# Patient Record
Sex: MALE | Race: BLACK OR AFRICAN AMERICAN | NOT HISPANIC OR LATINO | URBAN - METROPOLITAN AREA
[De-identification: names, ages, dates, MRNs, and addresses within clinical notes are randomized per-mention and may not be internally consistent; named-entity substitution may affect disease eponyms.]

---

## 2017-08-10 ENCOUNTER — EMERGENCY (EMERGENCY)
Facility: HOSPITAL | Age: 32
LOS: 1 days | Discharge: HOME | End: 2017-08-10
Admitting: EMERGENCY MEDICINE
Payer: COMMERCIAL

## 2017-08-10 VITALS
HEART RATE: 77 BPM | SYSTOLIC BLOOD PRESSURE: 138 MMHG | RESPIRATION RATE: 18 BRPM | DIASTOLIC BLOOD PRESSURE: 86 MMHG | OXYGEN SATURATION: 100 % | TEMPERATURE: 98 F

## 2017-08-10 PROCEDURE — 99284 EMERGENCY DEPT VISIT MOD MDM: CPT

## 2017-08-10 RX ORDER — CYCLOBENZAPRINE HYDROCHLORIDE 10 MG/1
1 TABLET, FILM COATED ORAL
Qty: 12 | Refills: 0 | OUTPATIENT
Start: 2017-08-10 | End: 2017-08-14

## 2017-08-10 RX ORDER — KETOROLAC TROMETHAMINE 30 MG/ML
30 SYRINGE (ML) INJECTION ONCE
Qty: 0 | Refills: 0 | Status: DISCONTINUED | OUTPATIENT
Start: 2017-08-10 | End: 2017-08-10

## 2017-08-10 RX ADMIN — Medication 30 MILLIGRAM(S): at 10:58

## 2017-08-10 NOTE — ED PROVIDER NOTE - OBJECTIVE STATEMENT
Pt is 33 y/o male with no significant PMHx here for eval of Rt sided LBP since yesterday. Pt states that he the silas started after he bent over to  heavy pipe at work. Pain has been coming and going, it is described as sharp, non-radiating, worse with movement,   pt denies ext weakness or numbness, denies saddle anesthesia, urinary retention, denies fever, chills, urinary sx, chronic steroids use, IVDU, denies hx of malignancy, has been ambulating with some discomfort. no pain meds taken PTA, denies hx of abilio issues.

## 2017-08-10 NOTE — ED PROVIDER NOTE - CARE PLAN
Principal Discharge DX:	Back muscle spasm  Instructions for follow-up, activity and diet:	Follow with your PMD within 48-72 hours.  Rest, no heavy lifting.  Warm compresses to area. Light walking. Take Naprosyn 375mg every 12 hrs  with food, cyclobenzaprine every 12 hours as needed for muscle spasm- caution dorwsiness/do not drive. Any worsening pain, weakness, numbness, bowel or urinary incontinence return to ER

## 2017-08-10 NOTE — ED PROVIDER NOTE - MEDICAL DECISION MAKING DETAILS
LBP, muscle spasm, no midline TTP, no neuro deficits on exam - nsaids, flexeril, warm compresses, gentle back exercise and pmd f/u.

## 2017-08-10 NOTE — ED PROVIDER NOTE - PLAN OF CARE
Follow with your PMD within 48-72 hours.  Rest, no heavy lifting.  Warm compresses to area. Light walking. Take Naprosyn 375mg every 12 hrs  with food, cyclobenzaprine every 12 hours as needed for muscle spasm- caution dorwsiness/do not drive. Any worsening pain, weakness, numbness, bowel or urinary incontinence return to ER

## 2017-08-14 ENCOUNTER — EMERGENCY (EMERGENCY)
Facility: HOSPITAL | Age: 32
LOS: 1 days | Discharge: HOME | End: 2017-08-14
Attending: EMERGENCY MEDICINE | Admitting: EMERGENCY MEDICINE
Payer: COMMERCIAL

## 2017-08-14 VITALS
SYSTOLIC BLOOD PRESSURE: 153 MMHG | DIASTOLIC BLOOD PRESSURE: 93 MMHG | HEART RATE: 89 BPM | TEMPERATURE: 99 F | RESPIRATION RATE: 18 BRPM | OXYGEN SATURATION: 100 %

## 2017-08-14 PROCEDURE — 99284 EMERGENCY DEPT VISIT MOD MDM: CPT

## 2017-08-14 RX ORDER — IBUPROFEN 200 MG
600 TABLET ORAL ONCE
Qty: 0 | Refills: 0 | Status: COMPLETED | OUTPATIENT
Start: 2017-08-14 | End: 2017-08-14

## 2017-08-14 RX ADMIN — Medication 600 MILLIGRAM(S): at 16:58

## 2017-08-14 NOTE — ED ADULT TRIAGE NOTE - CHIEF COMPLAINT QUOTE
Pt c/o R side hip pain. Was seen here 4 days ago. Coming in for a follow up, was not able to get appt with PMD. States pain has increased,

## 2017-08-14 NOTE — ED PROVIDER NOTE - MUSCULOSKELETAL MINIMAL EXAM
ambulates in obvious rle discomfort has pain lifting right foot off the ground but is able to fully range hip. strength is diminished due to pain upon hip flexion and has pain on external rotation more than internal rotation. minimal lumbar paraspinal ttp. no masses

## 2017-08-14 NOTE — ED PROVIDER NOTE - MEDICAL DECISION MAKING DETAILS
32y M with signs and symptoms consistent with iliopsoas injury. I do not believe prior prescription for cyclobenzaprine will help this condition. Rather, he should rely on Ibuprofen for pain relief. I have explained at length the etiology of these symptoms and the likely protracted nature of the recovery process may require outpatient MRI for definitive diagnosis. If he desires, he may follow up as outpatient with sports medicine clinic. Strict return precautions regarding fevers, chills, nausea, vomiting, abdominal pain, or any other concern. 32y M with signs and symptoms consistent with iliopsoas injury. I do not suspect iliopsoas abscess, but I have cautioned the patient as to the symptoms to look out for, since he is now predisposed to this condition, while his injury is new.  I do not believe prior prescription for cyclobenzaprine (muscle relaxer) will help this condition. Rather, he should rely on Ibuprofen for pain relief. I have explained at length the etiology of these symptoms and the likely protracted nature of the recovery process may require outpatient MRI for definitive diagnosis. If he desires, he may follow up as outpatient with sports medicine clinic. Strict return precautions regarding fevers, chills, nausea, vomiting, abdominal pain, or any other concern.

## 2017-08-14 NOTE — ED PROVIDER NOTE - OBJECTIVE STATEMENT
32y M c/o right groin, hip, and back pain 4 days ago while lifting something heavy at work. Seen in the ED on post injury day 1 at which time his pain was primarily lumbar lateral ileac crest. Pain has since migrated to his groin. Pain worse with external rotation and hip flexion. No fever, no chills, no nausea, no vomiting, abdominal pain, no scrotal masses, no scrotal swelling, no testicular pain, no dysuria.

## 2017-08-21 ENCOUNTER — EMERGENCY (EMERGENCY)
Facility: HOSPITAL | Age: 32
LOS: 1 days | Discharge: HOME | End: 2017-08-21
Attending: EMERGENCY MEDICINE | Admitting: EMERGENCY MEDICINE
Payer: COMMERCIAL

## 2017-08-21 VITALS
HEART RATE: 76 BPM | OXYGEN SATURATION: 99 % | RESPIRATION RATE: 16 BRPM | DIASTOLIC BLOOD PRESSURE: 101 MMHG | TEMPERATURE: 98 F | SYSTOLIC BLOOD PRESSURE: 148 MMHG

## 2017-08-21 PROCEDURE — 99284 EMERGENCY DEPT VISIT MOD MDM: CPT

## 2017-08-21 NOTE — ED PROVIDER NOTE - MUSCULOSKELETAL MINIMAL EXAM
minimal ttp right paraspinal lumbar area, minimal ttp w/ ROM of right hip, pelvis stable, no deformity, normal gait

## 2017-08-21 NOTE — ED PROVIDER NOTE - MEDICAL DECISION MAKING DETAILS
31 y/o M w/ MSK pain, pt states he can take Ibuprofen at home, here for referral for sports medicine. Will give pt referral. 31 y/o M w/ MSK pain, pt states he can take Ibuprofen at home, here for referral for sports medicine. Will give pt referral. Pt is aware of his HTN and is following with his PMD for HTN, and recently started on antihypertensive (unsure of name).

## 2017-08-21 NOTE — ED PROVIDER NOTE - OBJECTIVE STATEMENT
33 y/o M w/ PMHx of HTN, presents to the ED c/o right lower back pain, radiating to groin x1.5 weeks. Pt states he works as a  and was lifting heavy objects when he had a sudden onset of right lower back pain. Pt was seen in ED twice, dx w/ muscle spasm, Rx Naprosyn and Cyclobenzaprine w/ some relief. Pt presents today for sports medicine referral for work. Denies fever, chills or any other complaints. 31 y/o M w/ PMHx of HTN, presents to the ED c/o right lower back pain, radiating to groin x1.5 weeks. Pt states he works as a  and was lifting heavy objects when he had a sudden onset of right lower back pain 1.5 weeks ago. Pt was seen in ED twice, dx w/ muscle spasm, Rx Naprosyn and Cyclobenzaprine w/ some relief. Pt presents today for sports medicine referral for work. Denies fever, chills or any other complaints. No bowel or bladder incontinence or retention.

## 2017-08-23 ENCOUNTER — EMERGENCY (EMERGENCY)
Facility: HOSPITAL | Age: 32
LOS: 1 days | Discharge: HOME | End: 2017-08-23
Attending: EMERGENCY MEDICINE | Admitting: EMERGENCY MEDICINE
Payer: COMMERCIAL

## 2017-08-23 VITALS
OXYGEN SATURATION: 100 % | SYSTOLIC BLOOD PRESSURE: 139 MMHG | HEART RATE: 81 BPM | DIASTOLIC BLOOD PRESSURE: 106 MMHG | TEMPERATURE: 98 F | RESPIRATION RATE: 16 BRPM

## 2017-08-23 PROCEDURE — 99284 EMERGENCY DEPT VISIT MOD MDM: CPT

## 2017-08-23 NOTE — ED ADULT TRIAGE NOTE - CHIEF COMPLAINT QUOTE
pt sts need medical clearance to go back to work. pt been out of work x2 weeks secondary to chronic  back pain

## 2017-08-23 NOTE — ED PROVIDER NOTE - MUSCULOSKELETAL MINIMAL EXAM
right groin pain still worse w/ hip flexion, but much more improved than before. Ambulates normally (as opposed to last encounter w/ me on 8/14). FROM.

## 2017-08-23 NOTE — ED PROVIDER NOTE - MEDICAL DECISION MAKING DETAILS
Pt overall improved from 9 days ago. I feel that pt can return to work on light duty. He should still follow up w/ sports medicine. NSAIDs, return precautions. Pt overall improved from 9 days ago:  pain improved, walking better, less reliant on NSAIDs.  I feel that pt can return to work on light duty. He should still follow up w/ sports medicine. PRN NSAIDs, return precautions.

## 2017-08-23 NOTE — ED PROVIDER NOTE - NS_ ATTENDINGSCRIBEDETAILS _ED_A_ED_FT
MD Yeung:  The scribe's documentation has been prepared under my direction and personally reviewed by me in its entirety. I confirm that the note above accurately reflects all work, treatment, procedures, and medical decision making performed by me.  MD Yeung:  see the MDM & progress notes for my I&P.

## 2017-08-23 NOTE — ED PROVIDER NOTE - OBJECTIVE STATEMENT
31 y/o M w/ PMHx of HTN, presents to the ED for medical clearance. Originally seen in ED on 8/10 for lower back/groin pain. Subsequently seen by me on 8/14, dx w/ iliopsoas muscle injury. Subsequently seen in ED on 8/21 for referral to sports medicine. Returns today. Overall improved, seeking notes for safe return to work. Pt still endorses right groin pain, but improved. Denies back pain, numbness/tingling, weakness, fever, chills or any other complaints.

## 2022-06-28 NOTE — ED ADULT TRIAGE NOTE - CHIEF COMPLAINT QUOTE
Order is cancelled    patient reports lifting heavy pipe yesterday when he felt a sharp pain in his right lower back. denies fall or injury, denies numbness or tingeling in the extremities. pt ambulatory in triage.

## 2023-09-20 NOTE — ED PROVIDER NOTE - DISCUSSED CLINICAL AND RADIOLOGICAL FINDINGS WITH, MDM
Sent Chip[employer] message to patient.    Buzz eL, Triage RN Wilfredo Farrar  2:50 PM 9/20/2023     
patient